# Patient Record
(demographics unavailable — no encounter records)

---

## 2018-01-16 NOTE — PROGRESS NOTES
Chief Complaint  patient is here for pneumonia vaccine- was unsure of what vaccine she received at [facility] about 4 years ago ago, i spoke with Dr Iris Clemente and per his instructions the patient received the Prevnar 13 today  Active Problems    1  Anemia (285 9) (D64 9)   2  Benign essential hypertension (401 1) (I10)   3  Chronic constipation (564 00) (K59 09)   4  Chronic obstructive pulmonary disease (496) (J44 9)   5  Constipation (564 00) (K59 00)   6  Hypercholesteremia (272 0) (E78 00)   7  Influenza vaccination administered during current admission (V04 81) (Z23)   8  Left knee pain (719 46) (M25 562)   9  Malignant neoplasm of connective or soft tissue (171 9) (C49 9)   10  Osteoarthritis of knee (715 36) (M17 9)   11  Pes anserine bursitis (726 61) (M70 50)   12  Restless legs syndrome (333 94) (G25 81)   13  Umbilical hernia (085 2) (K42 9)    Current Meds   1  Atorvastatin Calcium 40 MG Oral Tablet; Take 1 tablet daily; Therapy: 94IJV4520 to (Last Rx:28Vci6320)  Requested for: 25Ozb3989 Ordered   2  Furosemide 20 MG Oral Tablet; Take 1 tablet daily; Therapy: 70Czy6585 to (Last Rx:99Mlk1920)  Requested for: 48DFR8185 Ordered   3  Metoprolol Tartrate 25 MG Oral Tablet; TAKE ONE-HALF (1/2) TABLET (12 5 MG) EVERY   12 HOURS FOR HYPERTENSION; Therapy: 10Rtu9821 to (Last Rx:52Htu9814)  Requested for: 90ZQN2222 Ordered   4  TraMADol HCl - 50 MG Oral Tablet; TAKE 1 TABLET 3 times daily PRN; Therapy: 37IQL8373 to (Last Rx:14Csg8389) Ordered   5  Valsartan-Hydrochlorothiazide 320-25 MG Oral Tablet; Take 1 tablet daily; Therapy: 82CMV1532 to (Last Rx:69Vat6803)  Requested for: 49OMZ6795 Ordered    Allergies    1   ACE Inhibitors    Plan  Need for pneumococcal vaccine    · Prevnar 13 Intramuscular Suspension    Signatures   Electronically signed by : JUSTINO Alvarez ; Nov 17 2016  8:31PM EST

## 2018-02-25 DIAGNOSIS — I10 ESSENTIAL HYPERTENSION: Primary | ICD-10-CM

## 2018-02-26 RX ORDER — FUROSEMIDE 20 MG/1
TABLET ORAL
Qty: 90 TABLET | Refills: 3 | Status: SHIPPED | OUTPATIENT
Start: 2018-02-26

## 2018-02-26 RX ORDER — VALSARTAN AND HYDROCHLOROTHIAZIDE 320; 25 MG/1; MG/1
TABLET, FILM COATED ORAL
Qty: 90 TABLET | Refills: 3 | Status: SHIPPED | OUTPATIENT
Start: 2018-02-26 | End: 2019-03-08 | Stop reason: SDUPTHER

## 2019-03-08 DIAGNOSIS — I10 ESSENTIAL HYPERTENSION: ICD-10-CM

## 2019-03-08 RX ORDER — CANDESARTAN CILEXETIL AND HYDROCHLOROTHIAZIDE 32; 25 MG/1; MG/1
1 TABLET ORAL DAILY
Qty: 90 EACH | Refills: 3 | Status: SHIPPED | OUTPATIENT
Start: 2019-03-08

## 2019-03-08 RX ORDER — VALSARTAN AND HYDROCHLOROTHIAZIDE 320; 25 MG/1; MG/1
TABLET, FILM COATED ORAL
Qty: 90 TABLET | Refills: 3 | Status: CANCELLED | OUTPATIENT
Start: 2019-03-08